# Patient Record
Sex: MALE
[De-identification: names, ages, dates, MRNs, and addresses within clinical notes are randomized per-mention and may not be internally consistent; named-entity substitution may affect disease eponyms.]

---

## 2023-04-10 ENCOUNTER — HOSPITAL ENCOUNTER (INPATIENT)
Dept: HOSPITAL 95 - ICUE | Age: 88
LOS: 4 days | Discharge: HOME HEALTH | DRG: 441 | End: 2023-04-14
Attending: INTERNAL MEDICINE | Admitting: INTERNAL MEDICINE
Payer: MEDICARE

## 2023-04-10 VITALS — SYSTOLIC BLOOD PRESSURE: 59 MMHG | DIASTOLIC BLOOD PRESSURE: 41 MMHG

## 2023-04-10 VITALS — SYSTOLIC BLOOD PRESSURE: 118 MMHG | DIASTOLIC BLOOD PRESSURE: 44 MMHG

## 2023-04-10 VITALS — SYSTOLIC BLOOD PRESSURE: 131 MMHG | DIASTOLIC BLOOD PRESSURE: 68 MMHG

## 2023-04-10 VITALS — WEIGHT: 220.68 LBS | BODY MASS INDEX: 32.69 KG/M2 | HEIGHT: 69 IN

## 2023-04-10 VITALS — SYSTOLIC BLOOD PRESSURE: 67 MMHG | DIASTOLIC BLOOD PRESSURE: 48 MMHG

## 2023-04-10 VITALS — SYSTOLIC BLOOD PRESSURE: 130 MMHG | DIASTOLIC BLOOD PRESSURE: 56 MMHG

## 2023-04-10 VITALS — SYSTOLIC BLOOD PRESSURE: 134 MMHG | DIASTOLIC BLOOD PRESSURE: 91 MMHG

## 2023-04-10 VITALS — SYSTOLIC BLOOD PRESSURE: 96 MMHG | DIASTOLIC BLOOD PRESSURE: 65 MMHG

## 2023-04-10 VITALS — SYSTOLIC BLOOD PRESSURE: 81 MMHG | DIASTOLIC BLOOD PRESSURE: 37 MMHG

## 2023-04-10 DIAGNOSIS — E03.9: ICD-10-CM

## 2023-04-10 DIAGNOSIS — E78.5: ICD-10-CM

## 2023-04-10 DIAGNOSIS — R57.8: ICD-10-CM

## 2023-04-10 DIAGNOSIS — K31.89: ICD-10-CM

## 2023-04-10 DIAGNOSIS — E11.22: ICD-10-CM

## 2023-04-10 DIAGNOSIS — M51.36: ICD-10-CM

## 2023-04-10 DIAGNOSIS — I12.9: ICD-10-CM

## 2023-04-10 DIAGNOSIS — Z95.1: ICD-10-CM

## 2023-04-10 DIAGNOSIS — N18.9: ICD-10-CM

## 2023-04-10 DIAGNOSIS — I25.10: ICD-10-CM

## 2023-04-10 DIAGNOSIS — D62: ICD-10-CM

## 2023-04-10 DIAGNOSIS — Z95.2: ICD-10-CM

## 2023-04-10 DIAGNOSIS — Z79.01: ICD-10-CM

## 2023-04-10 DIAGNOSIS — I85.11: ICD-10-CM

## 2023-04-10 DIAGNOSIS — K70.30: ICD-10-CM

## 2023-04-10 DIAGNOSIS — I48.91: ICD-10-CM

## 2023-04-10 DIAGNOSIS — K76.6: Primary | ICD-10-CM

## 2023-04-10 LAB
ALBUMIN SERPL BCP-MCNC: 2.4 G/DL (ref 3.4–5)
ALBUMIN/GLOB SERPL: 0.8 {RATIO} (ref 0.8–1.8)
ALT SERPL W P-5'-P-CCNC: 25 U/L (ref 12–78)
ANION GAP SERPL CALCULATED.4IONS-SCNC: 5 MMOL/L (ref 6–16)
AST SERPL W P-5'-P-CCNC: 26 U/L (ref 12–37)
BILIRUB SERPL-MCNC: 0.8 MG/DL (ref 0.1–1)
BUN SERPL-MCNC: 135 MG/DL (ref 8–24)
CALCIUM SERPL-MCNC: 7.8 MG/DL (ref 8.5–10.1)
CHLORIDE SERPL-SCNC: 116 MMOL/L (ref 98–108)
CO2 SERPL-SCNC: 15 MMOL/L (ref 21–32)
CREAT SERPL-MCNC: 2.01 MG/DL (ref 0.6–1.2)
GLOBULIN SER CALC-MCNC: 3.2 G/DL (ref 2.2–4)
GLUCOSE SERPL-MCNC: 260 MG/DL (ref 70–99)
HCT VFR BLD AUTO: 25.3 % (ref 37–53)
HCT VFR BLD AUTO: 25.8 % (ref 37–53)
HGB BLD-MCNC: 8.4 G/DL (ref 13.5–17.5)
HGB BLD-MCNC: 8.6 G/DL (ref 13.5–17.5)
PH BLDV: 7.23 [PH] (ref 7.34–7.37)
POTASSIUM SERPL-SCNC: 5.5 MMOL/L (ref 3.5–5.5)
PROT SERPL-MCNC: 5.6 G/DL (ref 6.4–8.2)
PROTHROMBIN TIME: 41.9 SEC (ref 9.7–11.5)
SODIUM SERPL-SCNC: 136 MMOL/L (ref 136–145)

## 2023-04-10 PROCEDURE — A9270 NON-COVERED ITEM OR SERVICE: HCPCS

## 2023-04-10 PROCEDURE — P9016 RBC LEUKOCYTES REDUCED: HCPCS

## 2023-04-10 PROCEDURE — P9059 PLASMA, FRZ BETWEEN 8-24HOUR: HCPCS

## 2023-04-10 PROCEDURE — C9113 INJ PANTOPRAZOLE SODIUM, VIA: HCPCS

## 2023-04-10 NOTE — NUR
NEW PT ADMIT FROM [place]:
 
DIRECT ADMIT FROM [place] ARRIVED TO THE UNIT @ 1936; PT ADMITED WITH
HEMORRHAGIC SHOCK SECONDARY TO UPPER GI BLEED. PT ARRIVES VIA GURNEY WITH
THREE FLIGHT RN'S. PT A&O X 4 AND VERY PLEASANT. PT IS ABLE TO TRANSFER
HIMSELF OVER TO NEW BED AND STEADY GAIT IS OBSERVED. PT STATES THAT HE IS
FEELING WEAK BUT NOT TO WHERE HE CANNOT AMBULATE SAFELY. PT HAS VENT PACED
RYTHYM ON MONITOR WITH HR 80'S AND SBP ; PT STATES NO C/O CHEST PAIN AT
THIS TIME. PT ON RA WITH CLEAR LUNG SOUNDS THROUGHOUT AND SPO2 96< AND RR
20-22. ABD OBESE, SOFT AND NON-TENDER; HYPOACTIVE BOWEL SOUNDS IN ALL
QUADRANTS. PT USING URINAL INDEPENDENTLY FOR VOIDING; URINE CLEAR AND YELLOW.
PT HAS PPP X 4, SKIN WARM AND INTACT; BLE AND FEET HAVE NOTED EDEMA 1+. PT AHS
C/O PAIN IN LOWER BACK RELATED TO CHRONIC LOWER BACK DETERIATING DISC DISEASE.
HEATING PAD APPLIED TO LOWER BACK AND PILLOWS TUCKED FOR COMFORT; PT
TOLERATING WELL AND HAS DECLINED MEDICATION FOR PAIN AT THIS TIME. PT'S SON,
BROOKE, CALL FOR UPDATED AND ALL QUESTIONS ANSWERED AT THIS TIME; BROOKE STATES
THAT HE WILL BE BY TOMORROW AFTERNOON AROUND [id number]. BED LOWERED, CALL
LIGHT IN REACH, WILL CONTINUE TO MONITOR.

## 2023-04-11 VITALS — SYSTOLIC BLOOD PRESSURE: 120 MMHG | DIASTOLIC BLOOD PRESSURE: 55 MMHG

## 2023-04-11 VITALS — DIASTOLIC BLOOD PRESSURE: 58 MMHG | SYSTOLIC BLOOD PRESSURE: 99 MMHG

## 2023-04-11 VITALS — SYSTOLIC BLOOD PRESSURE: 130 MMHG | DIASTOLIC BLOOD PRESSURE: 56 MMHG

## 2023-04-11 VITALS — SYSTOLIC BLOOD PRESSURE: 111 MMHG | DIASTOLIC BLOOD PRESSURE: 50 MMHG

## 2023-04-11 VITALS — DIASTOLIC BLOOD PRESSURE: 51 MMHG | SYSTOLIC BLOOD PRESSURE: 128 MMHG

## 2023-04-11 VITALS — SYSTOLIC BLOOD PRESSURE: 123 MMHG | DIASTOLIC BLOOD PRESSURE: 66 MMHG

## 2023-04-11 VITALS — SYSTOLIC BLOOD PRESSURE: 112 MMHG | DIASTOLIC BLOOD PRESSURE: 60 MMHG

## 2023-04-11 VITALS — SYSTOLIC BLOOD PRESSURE: 102 MMHG | DIASTOLIC BLOOD PRESSURE: 37 MMHG

## 2023-04-11 VITALS — SYSTOLIC BLOOD PRESSURE: 129 MMHG | DIASTOLIC BLOOD PRESSURE: 51 MMHG

## 2023-04-11 VITALS — DIASTOLIC BLOOD PRESSURE: 57 MMHG | SYSTOLIC BLOOD PRESSURE: 117 MMHG

## 2023-04-11 VITALS — DIASTOLIC BLOOD PRESSURE: 63 MMHG | SYSTOLIC BLOOD PRESSURE: 137 MMHG

## 2023-04-11 VITALS — DIASTOLIC BLOOD PRESSURE: 46 MMHG | SYSTOLIC BLOOD PRESSURE: 98 MMHG

## 2023-04-11 VITALS — DIASTOLIC BLOOD PRESSURE: 56 MMHG | SYSTOLIC BLOOD PRESSURE: 117 MMHG

## 2023-04-11 VITALS — DIASTOLIC BLOOD PRESSURE: 62 MMHG | SYSTOLIC BLOOD PRESSURE: 124 MMHG

## 2023-04-11 VITALS — DIASTOLIC BLOOD PRESSURE: 52 MMHG | SYSTOLIC BLOOD PRESSURE: 108 MMHG

## 2023-04-11 VITALS — SYSTOLIC BLOOD PRESSURE: 114 MMHG | DIASTOLIC BLOOD PRESSURE: 57 MMHG

## 2023-04-11 VITALS — DIASTOLIC BLOOD PRESSURE: 70 MMHG | SYSTOLIC BLOOD PRESSURE: 113 MMHG

## 2023-04-11 VITALS — SYSTOLIC BLOOD PRESSURE: 120 MMHG | DIASTOLIC BLOOD PRESSURE: 50 MMHG

## 2023-04-11 VITALS — DIASTOLIC BLOOD PRESSURE: 59 MMHG | SYSTOLIC BLOOD PRESSURE: 122 MMHG

## 2023-04-11 VITALS — DIASTOLIC BLOOD PRESSURE: 54 MMHG | SYSTOLIC BLOOD PRESSURE: 114 MMHG

## 2023-04-11 VITALS — SYSTOLIC BLOOD PRESSURE: 100 MMHG | DIASTOLIC BLOOD PRESSURE: 56 MMHG

## 2023-04-11 VITALS — DIASTOLIC BLOOD PRESSURE: 51 MMHG | SYSTOLIC BLOOD PRESSURE: 109 MMHG

## 2023-04-11 VITALS — DIASTOLIC BLOOD PRESSURE: 41 MMHG | SYSTOLIC BLOOD PRESSURE: 99 MMHG

## 2023-04-11 VITALS — DIASTOLIC BLOOD PRESSURE: 59 MMHG | SYSTOLIC BLOOD PRESSURE: 117 MMHG

## 2023-04-11 VITALS — DIASTOLIC BLOOD PRESSURE: 57 MMHG | SYSTOLIC BLOOD PRESSURE: 121 MMHG

## 2023-04-11 VITALS — SYSTOLIC BLOOD PRESSURE: 108 MMHG | DIASTOLIC BLOOD PRESSURE: 50 MMHG

## 2023-04-11 VITALS — DIASTOLIC BLOOD PRESSURE: 51 MMHG | SYSTOLIC BLOOD PRESSURE: 118 MMHG

## 2023-04-11 VITALS — SYSTOLIC BLOOD PRESSURE: 104 MMHG | DIASTOLIC BLOOD PRESSURE: 45 MMHG

## 2023-04-11 VITALS — DIASTOLIC BLOOD PRESSURE: 66 MMHG | SYSTOLIC BLOOD PRESSURE: 103 MMHG

## 2023-04-11 VITALS — DIASTOLIC BLOOD PRESSURE: 40 MMHG | SYSTOLIC BLOOD PRESSURE: 97 MMHG

## 2023-04-11 VITALS — SYSTOLIC BLOOD PRESSURE: 108 MMHG | DIASTOLIC BLOOD PRESSURE: 63 MMHG

## 2023-04-11 VITALS — SYSTOLIC BLOOD PRESSURE: 117 MMHG | DIASTOLIC BLOOD PRESSURE: 57 MMHG

## 2023-04-11 VITALS — DIASTOLIC BLOOD PRESSURE: 42 MMHG | SYSTOLIC BLOOD PRESSURE: 95 MMHG

## 2023-04-11 VITALS — DIASTOLIC BLOOD PRESSURE: 58 MMHG | SYSTOLIC BLOOD PRESSURE: 120 MMHG

## 2023-04-11 VITALS — DIASTOLIC BLOOD PRESSURE: 72 MMHG | SYSTOLIC BLOOD PRESSURE: 136 MMHG

## 2023-04-11 VITALS — SYSTOLIC BLOOD PRESSURE: 114 MMHG | DIASTOLIC BLOOD PRESSURE: 60 MMHG

## 2023-04-11 VITALS — SYSTOLIC BLOOD PRESSURE: 122 MMHG | DIASTOLIC BLOOD PRESSURE: 61 MMHG

## 2023-04-11 VITALS — DIASTOLIC BLOOD PRESSURE: 57 MMHG | SYSTOLIC BLOOD PRESSURE: 115 MMHG

## 2023-04-11 VITALS — DIASTOLIC BLOOD PRESSURE: 67 MMHG | SYSTOLIC BLOOD PRESSURE: 129 MMHG

## 2023-04-11 VITALS — SYSTOLIC BLOOD PRESSURE: 98 MMHG | DIASTOLIC BLOOD PRESSURE: 53 MMHG

## 2023-04-11 VITALS — DIASTOLIC BLOOD PRESSURE: 58 MMHG | SYSTOLIC BLOOD PRESSURE: 122 MMHG

## 2023-04-11 VITALS — SYSTOLIC BLOOD PRESSURE: 140 MMHG | DIASTOLIC BLOOD PRESSURE: 63 MMHG

## 2023-04-11 VITALS — DIASTOLIC BLOOD PRESSURE: 49 MMHG | SYSTOLIC BLOOD PRESSURE: 117 MMHG

## 2023-04-11 VITALS — SYSTOLIC BLOOD PRESSURE: 118 MMHG | DIASTOLIC BLOOD PRESSURE: 53 MMHG

## 2023-04-11 VITALS — DIASTOLIC BLOOD PRESSURE: 55 MMHG | SYSTOLIC BLOOD PRESSURE: 110 MMHG

## 2023-04-11 VITALS — DIASTOLIC BLOOD PRESSURE: 55 MMHG | SYSTOLIC BLOOD PRESSURE: 127 MMHG

## 2023-04-11 VITALS — DIASTOLIC BLOOD PRESSURE: 57 MMHG | SYSTOLIC BLOOD PRESSURE: 123 MMHG

## 2023-04-11 VITALS — SYSTOLIC BLOOD PRESSURE: 130 MMHG | DIASTOLIC BLOOD PRESSURE: 60 MMHG

## 2023-04-11 VITALS — SYSTOLIC BLOOD PRESSURE: 121 MMHG | DIASTOLIC BLOOD PRESSURE: 50 MMHG

## 2023-04-11 VITALS — DIASTOLIC BLOOD PRESSURE: 58 MMHG | SYSTOLIC BLOOD PRESSURE: 125 MMHG

## 2023-04-11 VITALS — SYSTOLIC BLOOD PRESSURE: 117 MMHG | DIASTOLIC BLOOD PRESSURE: 51 MMHG

## 2023-04-11 VITALS — SYSTOLIC BLOOD PRESSURE: 121 MMHG | DIASTOLIC BLOOD PRESSURE: 58 MMHG

## 2023-04-11 VITALS — SYSTOLIC BLOOD PRESSURE: 114 MMHG | DIASTOLIC BLOOD PRESSURE: 53 MMHG

## 2023-04-11 VITALS — DIASTOLIC BLOOD PRESSURE: 41 MMHG | SYSTOLIC BLOOD PRESSURE: 126 MMHG

## 2023-04-11 VITALS — DIASTOLIC BLOOD PRESSURE: 51 MMHG | SYSTOLIC BLOOD PRESSURE: 117 MMHG

## 2023-04-11 VITALS — SYSTOLIC BLOOD PRESSURE: 113 MMHG | DIASTOLIC BLOOD PRESSURE: 58 MMHG

## 2023-04-11 VITALS — DIASTOLIC BLOOD PRESSURE: 67 MMHG | SYSTOLIC BLOOD PRESSURE: 134 MMHG

## 2023-04-11 VITALS — DIASTOLIC BLOOD PRESSURE: 54 MMHG | SYSTOLIC BLOOD PRESSURE: 104 MMHG

## 2023-04-11 VITALS — SYSTOLIC BLOOD PRESSURE: 107 MMHG | DIASTOLIC BLOOD PRESSURE: 82 MMHG

## 2023-04-11 VITALS — SYSTOLIC BLOOD PRESSURE: 126 MMHG | DIASTOLIC BLOOD PRESSURE: 53 MMHG

## 2023-04-11 VITALS — DIASTOLIC BLOOD PRESSURE: 55 MMHG | SYSTOLIC BLOOD PRESSURE: 113 MMHG

## 2023-04-11 VITALS — DIASTOLIC BLOOD PRESSURE: 64 MMHG | SYSTOLIC BLOOD PRESSURE: 138 MMHG

## 2023-04-11 LAB
ALBUMIN SERPL BCP-MCNC: 2.4 G/DL (ref 3.4–5)
ALBUMIN/GLOB SERPL: 0.8 {RATIO} (ref 0.8–1.8)
ALT SERPL W P-5'-P-CCNC: 26 U/L (ref 12–78)
ANION GAP SERPL CALCULATED.4IONS-SCNC: 2 MMOL/L (ref 6–16)
ANION GAP SERPL CALCULATED.4IONS-SCNC: 3 MMOL/L (ref 6–16)
ANION GAP SERPL CALCULATED.4IONS-SCNC: 4 MMOL/L (ref 6–16)
ANION GAP SERPL CALCULATED.4IONS-SCNC: 4 MMOL/L (ref 6–16)
AST SERPL W P-5'-P-CCNC: 25 U/L (ref 12–37)
BILIRUB SERPL-MCNC: 0.6 MG/DL (ref 0.1–1)
BUN SERPL-MCNC: 105 MG/DL (ref 8–24)
BUN SERPL-MCNC: 123 MG/DL (ref 8–24)
BUN SERPL-MCNC: 128 MG/DL (ref 8–24)
BUN SERPL-MCNC: 129 MG/DL (ref 8–24)
CALCIUM SERPL-MCNC: 7.6 MG/DL (ref 8.5–10.1)
CALCIUM SERPL-MCNC: 7.7 MG/DL (ref 8.5–10.1)
CALCIUM SERPL-MCNC: 7.8 MG/DL (ref 8.5–10.1)
CALCIUM SERPL-MCNC: 7.9 MG/DL (ref 8.5–10.1)
CHLORIDE SERPL-SCNC: 115 MMOL/L (ref 98–108)
CHLORIDE SERPL-SCNC: 117 MMOL/L (ref 98–108)
CHLORIDE SERPL-SCNC: 118 MMOL/L (ref 98–108)
CHLORIDE SERPL-SCNC: 119 MMOL/L (ref 98–108)
CO2 SERPL-SCNC: 16 MMOL/L (ref 21–32)
CO2 SERPL-SCNC: 18 MMOL/L (ref 21–32)
CO2 SERPL-SCNC: 21 MMOL/L (ref 21–32)
CO2 SERPL-SCNC: 24 MMOL/L (ref 21–32)
CREAT SERPL-MCNC: 1.95 MG/DL (ref 0.6–1.2)
CREAT SERPL-MCNC: 2.02 MG/DL (ref 0.6–1.2)
CREAT SERPL-MCNC: 2.09 MG/DL (ref 0.6–1.2)
CREAT SERPL-MCNC: 2.13 MG/DL (ref 0.6–1.2)
GLOBULIN SER CALC-MCNC: 2.9 G/DL (ref 2.2–4)
GLUCOSE SERPL-MCNC: 170 MG/DL (ref 70–99)
GLUCOSE SERPL-MCNC: 203 MG/DL (ref 70–99)
GLUCOSE SERPL-MCNC: 211 MG/DL (ref 70–99)
GLUCOSE SERPL-MCNC: 218 MG/DL (ref 70–99)
GLUCOSE UR-MCNC: (no result) MG/DL
HCT VFR BLD AUTO: 20.8 % (ref 37–53)
HCT VFR BLD AUTO: 21.1 % (ref 37–53)
HCT VFR BLD AUTO: 22.6 % (ref 37–53)
HCT VFR BLD AUTO: 22.7 % (ref 37–53)
HCT VFR BLD AUTO: 23.6 % (ref 37–53)
HGB BLD-MCNC: 7.1 G/DL (ref 13.5–17.5)
HGB BLD-MCNC: 7.2 G/DL (ref 13.5–17.5)
HGB BLD-MCNC: 7.4 G/DL (ref 13.5–17.5)
HGB BLD-MCNC: 7.7 G/DL (ref 13.5–17.5)
HGB BLD-MCNC: 8.1 G/DL (ref 13.5–17.5)
PH BLDV: 7.23 [PH] (ref 7.34–7.37)
POTASSIUM SERPL-SCNC: 4.1 MMOL/L (ref 3.5–5.5)
POTASSIUM SERPL-SCNC: 4.5 MMOL/L (ref 3.5–5.5)
POTASSIUM SERPL-SCNC: 4.8 MMOL/L (ref 3.5–5.5)
POTASSIUM SERPL-SCNC: 5 MMOL/L (ref 3.5–5.5)
PROT SERPL-MCNC: 5.3 G/DL (ref 6.4–8.2)
PROTHROMBIN TIME: 29.8 SEC (ref 9.7–11.5)
SODIUM SERPL-SCNC: 139 MMOL/L (ref 136–145)
SODIUM SERPL-SCNC: 140 MMOL/L (ref 136–145)
SODIUM SERPL-SCNC: 140 MMOL/L (ref 136–145)
SODIUM SERPL-SCNC: 142 MMOL/L (ref 136–145)
SP GR SPEC: 1.01 (ref 1–1.02)
UROBILINOGEN UR STRIP-MCNC: (no result) MG/DL

## 2023-04-11 PROCEDURE — 06L38CZ OCCLUSION OF ESOPHAGEAL VEIN WITH EXTRALUMINAL DEVICE, VIA NATURAL OR ARTIFICIAL OPENING ENDOSCOPIC: ICD-10-PCS | Performed by: INTERNAL MEDICINE

## 2023-04-11 NOTE — NUR
SHIFT ASSESSMENT
 
ASSUMED CARE OF PT @ 0700. PT A&OX4, FOLLOWING COMMANDS, AMBULATES TO BSC TO
URINATE. DENIES DIZZINESS, ADMITS TO FEELING WEAKER THAN NORMAL. NO BM SINCE
ADMIT, DENIES N/V, DENIES ABDOMINAL PAIN. BOWEL TONES ACTIVE. PT ON CLEAR
DIET, GIVING SMALL SIPS OF WATER AND ICE CHIPS, AWAITING EGD. BICARB,
PROTONIX, AND SANDOSTATIN INFUSING. CALL LIGHT IN REACH.

## 2023-04-11 NOTE — NUR
SHIFT SUMMARY:
 
PT ABLE TO SLEPT THROUGHOUT THE NIGHT; VSS THROUGHUOT THE NIGHT. PT VBG REPEAT
THIS MORNING REMAINS AT 7.23; DR MOORE NOTIFIED AND ORDERS RECIEVED FOR
ANOTHER 50 MEQ SODIUM BICAP IV PUSH AND A SODIUM BICARB INF. BICARB PUSH GIVEN
AND WAITING ON PHARMACY TO SEND DOWN BICARB INF. PT REMAINS A&O; HGB DROPPED
FROM 8.1 TO 7.7 THIS MORNING. PT HAS HAD NO BLOODY STOOLS SINCE ADMISSION. PT
HAD 1650 URINE OUTPUT THIS SHIFT. BED LOWERED, CALL LIGHT IN REACH, WILL
CONTINUE TO MONITOR UNTIL ONCOMING RN ARRIVES.

## 2023-04-11 NOTE — NUR
SHIFT SUMMARY
 
PT REMAINS A&OX4. 2U FFP ADMINSTERED THIS AFTERNOON PRIOR TO EGD. EGD FINISHED
SHORTLY AFTER 1800. 2 BANDS PLACED, NO COMPLICATIONS. PT UP TO BSC SHORTLY
AFTER PROCEDURE FINISHED, ABLE TO STAND AND TRANSFER c SBA, NO BM. PT DENIES
NAUSEA, C/O MILD ABD DISCOMFORT.  REQUESTING WATER, GIVEN ICE WATER AND ADVICE
TO START SLOW, SMALL SIPS. SANDOSTATIN, PROTONIX, AND BICARB GTT CONTINUE @
PRESCRIBED RATE. VSS, NO OTHER ACUTE CHANGES. CALL LIGHT IN REACH. REPORT
GIVEN TO ONCOMING NURSE.

## 2023-04-12 VITALS — SYSTOLIC BLOOD PRESSURE: 140 MMHG | DIASTOLIC BLOOD PRESSURE: 81 MMHG

## 2023-04-12 VITALS — DIASTOLIC BLOOD PRESSURE: 60 MMHG | SYSTOLIC BLOOD PRESSURE: 130 MMHG

## 2023-04-12 VITALS — DIASTOLIC BLOOD PRESSURE: 52 MMHG | SYSTOLIC BLOOD PRESSURE: 127 MMHG

## 2023-04-12 VITALS — SYSTOLIC BLOOD PRESSURE: 108 MMHG | DIASTOLIC BLOOD PRESSURE: 57 MMHG

## 2023-04-12 VITALS — DIASTOLIC BLOOD PRESSURE: 119 MMHG | SYSTOLIC BLOOD PRESSURE: 132 MMHG

## 2023-04-12 VITALS — SYSTOLIC BLOOD PRESSURE: 122 MMHG | DIASTOLIC BLOOD PRESSURE: 56 MMHG

## 2023-04-12 VITALS — SYSTOLIC BLOOD PRESSURE: 131 MMHG | DIASTOLIC BLOOD PRESSURE: 61 MMHG

## 2023-04-12 VITALS — DIASTOLIC BLOOD PRESSURE: 53 MMHG | SYSTOLIC BLOOD PRESSURE: 115 MMHG

## 2023-04-12 VITALS — SYSTOLIC BLOOD PRESSURE: 142 MMHG | DIASTOLIC BLOOD PRESSURE: 70 MMHG

## 2023-04-12 VITALS — SYSTOLIC BLOOD PRESSURE: 119 MMHG | DIASTOLIC BLOOD PRESSURE: 52 MMHG

## 2023-04-12 VITALS — DIASTOLIC BLOOD PRESSURE: 66 MMHG | SYSTOLIC BLOOD PRESSURE: 129 MMHG

## 2023-04-12 VITALS — DIASTOLIC BLOOD PRESSURE: 58 MMHG | SYSTOLIC BLOOD PRESSURE: 129 MMHG

## 2023-04-12 VITALS — DIASTOLIC BLOOD PRESSURE: 80 MMHG | SYSTOLIC BLOOD PRESSURE: 121 MMHG

## 2023-04-12 VITALS — SYSTOLIC BLOOD PRESSURE: 117 MMHG | DIASTOLIC BLOOD PRESSURE: 58 MMHG

## 2023-04-12 VITALS — SYSTOLIC BLOOD PRESSURE: 119 MMHG | DIASTOLIC BLOOD PRESSURE: 51 MMHG

## 2023-04-12 VITALS — DIASTOLIC BLOOD PRESSURE: 66 MMHG | SYSTOLIC BLOOD PRESSURE: 130 MMHG

## 2023-04-12 VITALS — DIASTOLIC BLOOD PRESSURE: 52 MMHG | SYSTOLIC BLOOD PRESSURE: 124 MMHG

## 2023-04-12 VITALS — DIASTOLIC BLOOD PRESSURE: 53 MMHG | SYSTOLIC BLOOD PRESSURE: 124 MMHG

## 2023-04-12 VITALS — DIASTOLIC BLOOD PRESSURE: 66 MMHG | SYSTOLIC BLOOD PRESSURE: 128 MMHG

## 2023-04-12 VITALS — SYSTOLIC BLOOD PRESSURE: 127 MMHG | DIASTOLIC BLOOD PRESSURE: 62 MMHG

## 2023-04-12 VITALS — SYSTOLIC BLOOD PRESSURE: 125 MMHG | DIASTOLIC BLOOD PRESSURE: 87 MMHG

## 2023-04-12 VITALS — SYSTOLIC BLOOD PRESSURE: 111 MMHG | DIASTOLIC BLOOD PRESSURE: 57 MMHG

## 2023-04-12 VITALS — SYSTOLIC BLOOD PRESSURE: 129 MMHG | DIASTOLIC BLOOD PRESSURE: 58 MMHG

## 2023-04-12 VITALS — SYSTOLIC BLOOD PRESSURE: 141 MMHG | DIASTOLIC BLOOD PRESSURE: 57 MMHG

## 2023-04-12 VITALS — DIASTOLIC BLOOD PRESSURE: 58 MMHG | SYSTOLIC BLOOD PRESSURE: 125 MMHG

## 2023-04-12 VITALS — SYSTOLIC BLOOD PRESSURE: 131 MMHG | DIASTOLIC BLOOD PRESSURE: 49 MMHG

## 2023-04-12 VITALS — DIASTOLIC BLOOD PRESSURE: 60 MMHG | SYSTOLIC BLOOD PRESSURE: 124 MMHG

## 2023-04-12 VITALS — DIASTOLIC BLOOD PRESSURE: 56 MMHG | SYSTOLIC BLOOD PRESSURE: 116 MMHG

## 2023-04-12 VITALS — DIASTOLIC BLOOD PRESSURE: 60 MMHG | SYSTOLIC BLOOD PRESSURE: 126 MMHG

## 2023-04-12 VITALS — DIASTOLIC BLOOD PRESSURE: 61 MMHG | SYSTOLIC BLOOD PRESSURE: 128 MMHG

## 2023-04-12 VITALS — SYSTOLIC BLOOD PRESSURE: 120 MMHG | DIASTOLIC BLOOD PRESSURE: 55 MMHG

## 2023-04-12 VITALS — SYSTOLIC BLOOD PRESSURE: 139 MMHG | DIASTOLIC BLOOD PRESSURE: 73 MMHG

## 2023-04-12 VITALS — DIASTOLIC BLOOD PRESSURE: 56 MMHG | SYSTOLIC BLOOD PRESSURE: 120 MMHG

## 2023-04-12 VITALS — DIASTOLIC BLOOD PRESSURE: 57 MMHG | SYSTOLIC BLOOD PRESSURE: 117 MMHG

## 2023-04-12 VITALS — DIASTOLIC BLOOD PRESSURE: 70 MMHG | SYSTOLIC BLOOD PRESSURE: 131 MMHG

## 2023-04-12 VITALS — SYSTOLIC BLOOD PRESSURE: 119 MMHG | DIASTOLIC BLOOD PRESSURE: 63 MMHG

## 2023-04-12 VITALS — DIASTOLIC BLOOD PRESSURE: 62 MMHG | SYSTOLIC BLOOD PRESSURE: 127 MMHG

## 2023-04-12 VITALS — SYSTOLIC BLOOD PRESSURE: 135 MMHG | DIASTOLIC BLOOD PRESSURE: 56 MMHG

## 2023-04-12 VITALS — SYSTOLIC BLOOD PRESSURE: 122 MMHG | DIASTOLIC BLOOD PRESSURE: 48 MMHG

## 2023-04-12 VITALS — DIASTOLIC BLOOD PRESSURE: 61 MMHG | SYSTOLIC BLOOD PRESSURE: 133 MMHG

## 2023-04-12 VITALS — SYSTOLIC BLOOD PRESSURE: 124 MMHG | DIASTOLIC BLOOD PRESSURE: 59 MMHG

## 2023-04-12 VITALS — DIASTOLIC BLOOD PRESSURE: 65 MMHG | SYSTOLIC BLOOD PRESSURE: 140 MMHG

## 2023-04-12 VITALS — DIASTOLIC BLOOD PRESSURE: 58 MMHG | SYSTOLIC BLOOD PRESSURE: 121 MMHG

## 2023-04-12 VITALS — DIASTOLIC BLOOD PRESSURE: 54 MMHG | SYSTOLIC BLOOD PRESSURE: 117 MMHG

## 2023-04-12 VITALS — SYSTOLIC BLOOD PRESSURE: 127 MMHG | DIASTOLIC BLOOD PRESSURE: 59 MMHG

## 2023-04-12 VITALS — DIASTOLIC BLOOD PRESSURE: 65 MMHG | SYSTOLIC BLOOD PRESSURE: 116 MMHG

## 2023-04-12 VITALS — SYSTOLIC BLOOD PRESSURE: 131 MMHG | DIASTOLIC BLOOD PRESSURE: 53 MMHG

## 2023-04-12 VITALS — DIASTOLIC BLOOD PRESSURE: 50 MMHG | SYSTOLIC BLOOD PRESSURE: 125 MMHG

## 2023-04-12 VITALS — SYSTOLIC BLOOD PRESSURE: 113 MMHG | DIASTOLIC BLOOD PRESSURE: 58 MMHG

## 2023-04-12 VITALS — DIASTOLIC BLOOD PRESSURE: 55 MMHG | SYSTOLIC BLOOD PRESSURE: 109 MMHG

## 2023-04-12 VITALS — DIASTOLIC BLOOD PRESSURE: 55 MMHG | SYSTOLIC BLOOD PRESSURE: 107 MMHG

## 2023-04-12 VITALS — SYSTOLIC BLOOD PRESSURE: 136 MMHG | DIASTOLIC BLOOD PRESSURE: 64 MMHG

## 2023-04-12 VITALS — DIASTOLIC BLOOD PRESSURE: 50 MMHG | SYSTOLIC BLOOD PRESSURE: 122 MMHG

## 2023-04-12 LAB
ALBUMIN SERPL BCP-MCNC: 2.4 G/DL (ref 3.4–5)
ANION GAP SERPL CALCULATED.4IONS-SCNC: 3 MMOL/L (ref 6–16)
ANION GAP SERPL CALCULATED.4IONS-SCNC: 4 MMOL/L (ref 6–16)
BUN SERPL-MCNC: 86 MG/DL (ref 8–24)
BUN SERPL-MCNC: 98 MG/DL (ref 8–24)
CALCIUM SERPL-MCNC: 7.3 MG/DL (ref 8.5–10.1)
CALCIUM SERPL-MCNC: 7.7 MG/DL (ref 8.5–10.1)
CHLORIDE SERPL-SCNC: 116 MMOL/L (ref 98–108)
CHLORIDE SERPL-SCNC: 120 MMOL/L (ref 98–108)
CO2 SERPL-SCNC: 21 MMOL/L (ref 21–32)
CO2 SERPL-SCNC: 23 MMOL/L (ref 21–32)
CREAT SERPL-MCNC: 1.91 MG/DL (ref 0.6–1.2)
CREAT SERPL-MCNC: 2.01 MG/DL (ref 0.6–1.2)
GLUCOSE SERPL-MCNC: 158 MG/DL (ref 70–99)
GLUCOSE SERPL-MCNC: 161 MG/DL (ref 70–99)
HCT VFR BLD AUTO: 20.1 % (ref 37–53)
HCT VFR BLD AUTO: 21.3 % (ref 37–53)
HCT VFR BLD AUTO: 23.6 % (ref 37–53)
HGB BLD-MCNC: 6.6 G/DL (ref 13.5–17.5)
HGB BLD-MCNC: 7.1 G/DL (ref 13.5–17.5)
HGB BLD-MCNC: 7.8 G/DL (ref 13.5–17.5)
MAGNESIUM SERPL-MCNC: 2.4 MG/DL (ref 1.6–2.4)
PHOSPHATE SERPL-MCNC: 3.9 MG/DL (ref 2.5–4.9)
POTASSIUM SERPL-SCNC: 4 MMOL/L (ref 3.5–5.5)
POTASSIUM SERPL-SCNC: 4.1 MMOL/L (ref 3.5–5.5)
PROTHROMBIN TIME: 15.4 SEC (ref 9.7–11.5)
SODIUM SERPL-SCNC: 143 MMOL/L (ref 136–145)
SODIUM SERPL-SCNC: 144 MMOL/L (ref 136–145)

## 2023-04-12 NOTE — NUR
SHIFT SUMMARY
NO ACUTE CHANGES THIS SHIFT. PT HAS REMAINED ALERT AND ORIENTED WHEN AWAKE. PT
IS PLEASANT AND ANSWERS QUESTIONS APPROPRIATELY. PT WORKED WITH PHYSICAL
THERAPY THIS SHIFT AND SAT IN CHAIR FOR SOME TIME. PT WITH MINIMAL ASSISTANCE
TO BSC TO VOID THIS SHIFT. VITAL SIGNS STABLE. SANDOSTATIN GTT INFUSING AT 50
MCG/HR. PT RECIEVED 1 UNIT PRBCS THIS AFTERNOON AND 2ND UNIT PRBCS INFUSING AT
THIS TIME. NO BLOODY STOOLS NOTED THIS SHIFT. PT FAMILY AT BEDSIDE THIS
AFTERNOON. PT TAKING FULL LIQUID DIET WELL. WILL CONTINUE TO MONITOR AND
REPORT OFF TO ONCOMING RN.

## 2023-04-12 NOTE — NUR
SHIFT SUMMARY
 
OVERNIGHT, PATIENT ALERT AND ORIENTED X4. RESTLESS, COOPERATIVE. MONITOR
SHOWING V-PACED RHYTHM, HR 60-70S. NORMOTENSIVE. S/P ENDOSCOPY; NO ACUTE
BLEEDING NOTED. REPORT GIVEN TO AM NURSE.

## 2023-04-12 NOTE — NUR
UPDATE
SAT W/ PT AND DISCUSSED PT'S WORRIES AND HAD A GENERAL CONVERSATION. PT HAS
EXPRESSED STRESS AND WORRY ABOUT CURRENT STAY, BEING FULL TIME CARETAKER FOR
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 WILL
ASK AM ABOUT POSSIBLE PALLIATIVE CARE/CARE MANAGEMENT FOR HELP W/ OUT OF
HOSPITAL CARE PLAN.

## 2023-04-12 NOTE — NUR
ASSUMED CARE
PT IS A&O X4; SPO2 >92% ON RA; MAP >65; HR IN THE 60'S W/ PACED RHYTHM. NO C/O
OF CP, SOB, OR NAUSEA. 2ND UNIT OF PRBC INFUSED W/ NO TRANSFUSION REACTION. PT
UP TO BEDSIDE COMMODE TWICE W/ C/O OF MILD WEAKNESS. PT DOES HAVE C/O OF 5/10
LOWER BACK PAIN. PT ALSO STATES THAT HE HAS SEVERE ANXIETY WHEN IT COMES TO
NEEDLES AND IV DRUG ADMINISTRATION.

## 2023-04-13 VITALS — DIASTOLIC BLOOD PRESSURE: 107 MMHG | SYSTOLIC BLOOD PRESSURE: 126 MMHG

## 2023-04-13 VITALS — SYSTOLIC BLOOD PRESSURE: 128 MMHG | DIASTOLIC BLOOD PRESSURE: 55 MMHG

## 2023-04-13 VITALS — DIASTOLIC BLOOD PRESSURE: 94 MMHG | SYSTOLIC BLOOD PRESSURE: 108 MMHG

## 2023-04-13 VITALS — SYSTOLIC BLOOD PRESSURE: 144 MMHG | DIASTOLIC BLOOD PRESSURE: 69 MMHG

## 2023-04-13 VITALS — DIASTOLIC BLOOD PRESSURE: 72 MMHG | SYSTOLIC BLOOD PRESSURE: 145 MMHG

## 2023-04-13 VITALS — SYSTOLIC BLOOD PRESSURE: 141 MMHG | DIASTOLIC BLOOD PRESSURE: 64 MMHG

## 2023-04-13 VITALS — DIASTOLIC BLOOD PRESSURE: 65 MMHG | SYSTOLIC BLOOD PRESSURE: 130 MMHG

## 2023-04-13 VITALS — DIASTOLIC BLOOD PRESSURE: 73 MMHG | SYSTOLIC BLOOD PRESSURE: 137 MMHG

## 2023-04-13 VITALS — SYSTOLIC BLOOD PRESSURE: 125 MMHG | DIASTOLIC BLOOD PRESSURE: 93 MMHG

## 2023-04-13 VITALS — SYSTOLIC BLOOD PRESSURE: 138 MMHG | DIASTOLIC BLOOD PRESSURE: 61 MMHG

## 2023-04-13 VITALS — DIASTOLIC BLOOD PRESSURE: 64 MMHG | SYSTOLIC BLOOD PRESSURE: 151 MMHG

## 2023-04-13 VITALS — DIASTOLIC BLOOD PRESSURE: 61 MMHG | SYSTOLIC BLOOD PRESSURE: 129 MMHG

## 2023-04-13 VITALS — DIASTOLIC BLOOD PRESSURE: 64 MMHG | SYSTOLIC BLOOD PRESSURE: 131 MMHG

## 2023-04-13 VITALS — DIASTOLIC BLOOD PRESSURE: 68 MMHG | SYSTOLIC BLOOD PRESSURE: 132 MMHG

## 2023-04-13 VITALS — SYSTOLIC BLOOD PRESSURE: 138 MMHG | DIASTOLIC BLOOD PRESSURE: 67 MMHG

## 2023-04-13 VITALS — SYSTOLIC BLOOD PRESSURE: 126 MMHG | DIASTOLIC BLOOD PRESSURE: 68 MMHG

## 2023-04-13 VITALS — DIASTOLIC BLOOD PRESSURE: 69 MMHG | SYSTOLIC BLOOD PRESSURE: 131 MMHG

## 2023-04-13 VITALS — SYSTOLIC BLOOD PRESSURE: 143 MMHG | DIASTOLIC BLOOD PRESSURE: 63 MMHG

## 2023-04-13 VITALS — SYSTOLIC BLOOD PRESSURE: 134 MMHG | DIASTOLIC BLOOD PRESSURE: 62 MMHG

## 2023-04-13 VITALS — DIASTOLIC BLOOD PRESSURE: 63 MMHG | SYSTOLIC BLOOD PRESSURE: 131 MMHG

## 2023-04-13 VITALS — DIASTOLIC BLOOD PRESSURE: 55 MMHG | SYSTOLIC BLOOD PRESSURE: 132 MMHG

## 2023-04-13 VITALS — DIASTOLIC BLOOD PRESSURE: 69 MMHG | SYSTOLIC BLOOD PRESSURE: 139 MMHG

## 2023-04-13 VITALS — SYSTOLIC BLOOD PRESSURE: 124 MMHG | DIASTOLIC BLOOD PRESSURE: 62 MMHG

## 2023-04-13 VITALS — DIASTOLIC BLOOD PRESSURE: 71 MMHG | SYSTOLIC BLOOD PRESSURE: 132 MMHG

## 2023-04-13 VITALS — SYSTOLIC BLOOD PRESSURE: 137 MMHG | DIASTOLIC BLOOD PRESSURE: 49 MMHG

## 2023-04-13 VITALS — SYSTOLIC BLOOD PRESSURE: 134 MMHG | DIASTOLIC BLOOD PRESSURE: 59 MMHG

## 2023-04-13 VITALS — DIASTOLIC BLOOD PRESSURE: 62 MMHG | SYSTOLIC BLOOD PRESSURE: 128 MMHG

## 2023-04-13 VITALS — SYSTOLIC BLOOD PRESSURE: 132 MMHG | DIASTOLIC BLOOD PRESSURE: 59 MMHG

## 2023-04-13 VITALS — SYSTOLIC BLOOD PRESSURE: 134 MMHG | DIASTOLIC BLOOD PRESSURE: 65 MMHG

## 2023-04-13 VITALS — SYSTOLIC BLOOD PRESSURE: 127 MMHG | DIASTOLIC BLOOD PRESSURE: 58 MMHG

## 2023-04-13 VITALS — SYSTOLIC BLOOD PRESSURE: 145 MMHG | DIASTOLIC BLOOD PRESSURE: 67 MMHG

## 2023-04-13 VITALS — DIASTOLIC BLOOD PRESSURE: 59 MMHG | SYSTOLIC BLOOD PRESSURE: 128 MMHG

## 2023-04-13 LAB
ALBUMIN SERPL BCP-MCNC: 2.6 G/DL (ref 3.4–5)
ANION GAP SERPL CALCULATED.4IONS-SCNC: 5 MMOL/L (ref 6–16)
BUN SERPL-MCNC: 65 MG/DL (ref 8–24)
CALCIUM SERPL-MCNC: 7.3 MG/DL (ref 8.5–10.1)
CHLORIDE SERPL-SCNC: 114 MMOL/L (ref 98–108)
CO2 SERPL-SCNC: 19 MMOL/L (ref 21–32)
CREAT SERPL-MCNC: 1.74 MG/DL (ref 0.6–1.2)
GLUCOSE SERPL-MCNC: 256 MG/DL (ref 70–99)
HCT VFR BLD AUTO: 26.1 % (ref 37–53)
HCT VFR BLD AUTO: 27.5 % (ref 37–53)
HCT VFR BLD AUTO: 27.6 % (ref 37–53)
HGB BLD-MCNC: 8.8 G/DL (ref 13.5–17.5)
HGB BLD-MCNC: 9.2 G/DL (ref 13.5–17.5)
HGB BLD-MCNC: 9.2 G/DL (ref 13.5–17.5)
PHOSPHATE SERPL-MCNC: 3.3 MG/DL (ref 2.5–4.9)
POTASSIUM SERPL-SCNC: 4.1 MMOL/L (ref 3.5–5.5)
PROTHROMBIN TIME: 13 SEC (ref 9.7–11.5)
SODIUM SERPL-SCNC: 138 MMOL/L (ref 136–145)

## 2023-04-13 NOTE — NUR
ASSUMED CARE OF PT AT 1915. REPORT RECEIVED AT BEDSIDE. PT PRESENTS IN BED.
ALERT AND ORIENTED. PLEASANT AND COOPERATIVE WITH CARE AND ASSESSMENT. PT
CONTINUES ON SANDOSTATIN DRIP. NO S/S BLEEDING TO NOTE. WILL REVEIW CHART AND
PLAN OF CARE FOR THIS PT.

## 2023-04-13 NOTE — NUR
SHIFT SUMMARY
 
PT RESTING IN BED, TRANSITIONED THROGHOUT THE DAY FROM BED TO CHAIR WITH
MINIMAL ASSISTANCE. HR 70S PACED, BP STABLE. SANDOSTATIN INFUSING AT 25MLS/HR
IN LEFT ARM. H&H STABLE. NO ACUTE EVENTS THROUGHOUT THE SHIFT. WILL CONTINUE
TO MONITOR AND GIVE REPORT TO ONCOMING RN.

## 2023-04-13 NOTE — NUR
SHIFT SUMMARY
PT IS A&O X4; VITALS REMAINED STABLE T/O SHIFT. PT STATED THAT HE BARELY SLEPT
LAST NIGHT AND HAS NOT BEEN ABLE TO SLEEP FOR THE PAST FEW NIGHTS, OTHERWISE
NO C/O'S. PT CONTINUES TO NOT HAVE C/P, SOB, OR NAUSEA. NO ACUTE EVENTS
OVERNIGHT.

## 2023-04-14 VITALS — DIASTOLIC BLOOD PRESSURE: 66 MMHG | SYSTOLIC BLOOD PRESSURE: 150 MMHG

## 2023-04-14 VITALS — DIASTOLIC BLOOD PRESSURE: 78 MMHG | SYSTOLIC BLOOD PRESSURE: 145 MMHG

## 2023-04-14 VITALS — DIASTOLIC BLOOD PRESSURE: 65 MMHG | SYSTOLIC BLOOD PRESSURE: 138 MMHG

## 2023-04-14 LAB
HCT VFR BLD AUTO: 27.1 % (ref 37–53)
HGB BLD-MCNC: 9 G/DL (ref 13.5–17.5)
PROTHROMBIN TIME: 16.7 SEC (ref 9.7–11.5)

## 2023-04-14 NOTE — NUR
PT HAS BEEN UP IN UNIT. AMBULATES TO SHOWER WITH STANDBY ASSIST. PT USES
WHEELCHAIR AND PUSHES CHAIR TO SHOWER. PT DOES STATE GOOD TOLERANCE. PT USES
URINAL AT BEDSIDE. REPORT GIVEN TO MYRIAM LAWSON. REPORT GIVEN IN SBAR FASHION. PT
TO TRANSFER TO ROOM 306.

## 2023-04-14 NOTE — NUR
DISCHARGE-1145
PT DISCHARGED TO HOME. FAMILY/FRIENDS AT BEDSIDE. EDUCATION PROVIDED ON
DISCHARGE INSTRUCTIONS. NO QUESTIONS FROM PT AT THIS TIME. PT ALERT AND
ORIENTEDX4. WALKED TO WHEEL CHAIR BUT USED FURNITURE FOR SUPPORT